# Patient Record
Sex: FEMALE | Race: OTHER | NOT HISPANIC OR LATINO
[De-identification: names, ages, dates, MRNs, and addresses within clinical notes are randomized per-mention and may not be internally consistent; named-entity substitution may affect disease eponyms.]

---

## 2018-09-04 ENCOUNTER — APPOINTMENT (OUTPATIENT)
Dept: INTERNAL MEDICINE | Facility: CLINIC | Age: 23
End: 2018-09-04
Payer: COMMERCIAL

## 2018-09-04 VITALS
DIASTOLIC BLOOD PRESSURE: 82 MMHG | HEIGHT: 66 IN | TEMPERATURE: 98.2 F | BODY MASS INDEX: 41.95 KG/M2 | RESPIRATION RATE: 12 BRPM | OXYGEN SATURATION: 99 % | SYSTOLIC BLOOD PRESSURE: 125 MMHG | HEART RATE: 67 BPM | WEIGHT: 261 LBS

## 2018-09-04 DIAGNOSIS — Z83.3 FAMILY HISTORY OF DIABETES MELLITUS: ICD-10-CM

## 2018-09-04 PROCEDURE — 99385 PREV VISIT NEW AGE 18-39: CPT

## 2018-09-06 LAB
25(OH)D3 SERPL-MCNC: 23.4 NG/ML
ALBUMIN SERPL ELPH-MCNC: 4.5 G/DL
ALP BLD-CCNC: 63 U/L
ALT SERPL-CCNC: 19 U/L
ANION GAP SERPL CALC-SCNC: 12 MMOL/L
AST SERPL-CCNC: 21 U/L
BASOPHILS # BLD AUTO: 0.02 K/UL
BASOPHILS NFR BLD AUTO: 0.3 %
BILIRUB SERPL-MCNC: 0.3 MG/DL
BUN SERPL-MCNC: 13 MG/DL
CALCIUM SERPL-MCNC: 10.2 MG/DL
CHLORIDE SERPL-SCNC: 103 MMOL/L
CO2 SERPL-SCNC: 27 MMOL/L
CREAT SERPL-MCNC: 0.78 MG/DL
EOSINOPHIL # BLD AUTO: 0.08 K/UL
EOSINOPHIL NFR BLD AUTO: 1.1 %
GLUCOSE SERPL-MCNC: 81 MG/DL
HBA1C MFR BLD HPLC: 5.4 %
HCT VFR BLD CALC: 44.1 %
HGB BLD-MCNC: 13.9 G/DL
IMM GRANULOCYTES NFR BLD AUTO: 0.1 %
LYMPHOCYTES # BLD AUTO: 1.65 K/UL
LYMPHOCYTES NFR BLD AUTO: 23.4 %
MAN DIFF?: NORMAL
MCHC RBC-ENTMCNC: 26.9 PG
MCHC RBC-ENTMCNC: 31.5 GM/DL
MCV RBC AUTO: 85.3 FL
MONOCYTES # BLD AUTO: 0.4 K/UL
MONOCYTES NFR BLD AUTO: 5.7 %
NEUTROPHILS # BLD AUTO: 4.88 K/UL
NEUTROPHILS NFR BLD AUTO: 69.4 %
PLATELET # BLD AUTO: 275 K/UL
POTASSIUM SERPL-SCNC: 4.5 MMOL/L
PROT SERPL-MCNC: 7.8 G/DL
RBC # BLD: 5.17 M/UL
RBC # FLD: 15.6 %
SODIUM SERPL-SCNC: 142 MMOL/L
TSH SERPL-ACNC: 1.74 UIU/ML
VIT B12 SERPL-MCNC: 669 PG/ML
WBC # FLD AUTO: 7.04 K/UL

## 2018-09-06 NOTE — HEALTH RISK ASSESSMENT
[With Family] : lives with family [Employed] : employed [Single] : single [Sexually Active] : sexually active [] : No [de-identified] : occasional [PapSmearDate] : about 6 years ago [FreeTextEntry2] :

## 2018-09-06 NOTE — ASSESSMENT
[FreeTextEntry1] : Physical\par blood work ordered\par referred to gyn\par \par Obesity\par referred to nutritionist\par \par irregular periods\par referred to gyn\par \par f/u in one week for lab resukts

## 2018-09-06 NOTE — HISTORY OF PRESENT ILLNESS
[FreeTextEntry1] : 21 yo female, accompanied by her mother, presents for annual physical.\par She reports of having irregular periods. Had 2 periods in the last 4 months. \par

## 2018-09-21 ENCOUNTER — APPOINTMENT (OUTPATIENT)
Dept: INTERNAL MEDICINE | Facility: CLINIC | Age: 23
End: 2018-09-21

## 2018-09-25 LAB
CHOLEST SERPL-MCNC: 135 MG/DL
CHOLEST/HDLC SERPL: 3.1 RATIO
HDLC SERPL-MCNC: 44 MG/DL
LDLC SERPL CALC-MCNC: 57 MG/DL
TRIGL SERPL-MCNC: 171 MG/DL

## 2018-10-20 ENCOUNTER — APPOINTMENT (OUTPATIENT)
Dept: ORTHOPEDIC SURGERY | Facility: CLINIC | Age: 23
End: 2018-10-20
Payer: COMMERCIAL

## 2018-10-20 VITALS
HEART RATE: 69 BPM | SYSTOLIC BLOOD PRESSURE: 122 MMHG | BODY MASS INDEX: 41.78 KG/M2 | HEIGHT: 66 IN | DIASTOLIC BLOOD PRESSURE: 82 MMHG | WEIGHT: 260 LBS

## 2018-10-20 DIAGNOSIS — M54.6 PAIN IN THORACIC SPINE: ICD-10-CM

## 2018-10-20 DIAGNOSIS — Z78.9 OTHER SPECIFIED HEALTH STATUS: ICD-10-CM

## 2018-10-20 DIAGNOSIS — Z87.39 PERSONAL HISTORY OF OTHER DISEASES OF THE MUSCULOSKELETAL SYSTEM AND CONNECTIVE TISSUE: ICD-10-CM

## 2018-10-20 DIAGNOSIS — E66.9 OBESITY, UNSPECIFIED: ICD-10-CM

## 2018-10-20 DIAGNOSIS — S39.012A STRAIN OF MUSCLE, FASCIA AND TENDON OF LOWER BACK, INITIAL ENCOUNTER: ICD-10-CM

## 2018-10-20 DIAGNOSIS — M54.2 CERVICALGIA: ICD-10-CM

## 2018-10-20 PROCEDURE — 72070 X-RAY EXAM THORAC SPINE 2VWS: CPT

## 2018-10-20 PROCEDURE — 72040 X-RAY EXAM NECK SPINE 2-3 VW: CPT

## 2018-10-20 PROCEDURE — 99203 OFFICE O/P NEW LOW 30 MIN: CPT

## 2018-10-21 PROBLEM — Z87.39 HISTORY OF BACK PAIN: Status: RESOLVED | Noted: 2018-10-20 | Resolved: 2018-10-21

## 2018-10-23 PROBLEM — M54.6 MIDLINE THORACIC BACK PAIN, UNSPECIFIED CHRONICITY: Status: ACTIVE | Noted: 2018-10-20

## 2018-10-23 PROBLEM — S39.012A BACK STRAIN: Status: ACTIVE | Noted: 2018-09-19

## 2018-10-23 PROBLEM — M54.2 CERVICAL PAIN (NECK): Status: ACTIVE | Noted: 2018-10-20

## 2018-10-31 ENCOUNTER — APPOINTMENT (OUTPATIENT)
Dept: ORTHOPEDIC SURGERY | Facility: CLINIC | Age: 23
End: 2018-10-31

## 2018-11-07 ENCOUNTER — APPOINTMENT (OUTPATIENT)
Dept: ORTHOPEDIC SURGERY | Facility: CLINIC | Age: 23
End: 2018-11-07
Payer: COMMERCIAL

## 2018-11-07 VITALS
HEIGHT: 66 IN | WEIGHT: 260 LBS | BODY MASS INDEX: 41.78 KG/M2 | SYSTOLIC BLOOD PRESSURE: 127 MMHG | HEART RATE: 65 BPM | DIASTOLIC BLOOD PRESSURE: 77 MMHG

## 2018-11-07 DIAGNOSIS — M41.9 SCOLIOSIS, UNSPECIFIED: ICD-10-CM

## 2018-11-07 PROCEDURE — 99214 OFFICE O/P EST MOD 30 MIN: CPT

## 2019-10-05 ENCOUNTER — EMERGENCY (EMERGENCY)
Facility: HOSPITAL | Age: 24
LOS: 1 days | Discharge: HOME | End: 2019-10-05
Attending: STUDENT IN AN ORGANIZED HEALTH CARE EDUCATION/TRAINING PROGRAM | Admitting: STUDENT IN AN ORGANIZED HEALTH CARE EDUCATION/TRAINING PROGRAM
Payer: COMMERCIAL

## 2019-10-05 ENCOUNTER — EMERGENCY (EMERGENCY)
Facility: HOSPITAL | Age: 24
LOS: 1 days | Discharge: HOME | End: 2019-10-05
Attending: EMERGENCY MEDICINE
Payer: COMMERCIAL

## 2019-10-05 VITALS
WEIGHT: 242.07 LBS | HEART RATE: 66 BPM | RESPIRATION RATE: 17 BRPM | OXYGEN SATURATION: 96 % | TEMPERATURE: 98 F | SYSTOLIC BLOOD PRESSURE: 110 MMHG | HEIGHT: 65 IN | DIASTOLIC BLOOD PRESSURE: 80 MMHG

## 2019-10-05 VITALS
RESPIRATION RATE: 18 BRPM | SYSTOLIC BLOOD PRESSURE: 122 MMHG | OXYGEN SATURATION: 100 % | DIASTOLIC BLOOD PRESSURE: 70 MMHG | TEMPERATURE: 98 F | HEART RATE: 84 BPM

## 2019-10-05 PROCEDURE — 99283 EMERGENCY DEPT VISIT LOW MDM: CPT

## 2019-10-05 PROCEDURE — 99284 EMERGENCY DEPT VISIT MOD MDM: CPT

## 2019-10-05 RX ORDER — ACETAMINOPHEN 500 MG
650 TABLET ORAL ONCE
Refills: 0 | Status: COMPLETED | OUTPATIENT
Start: 2019-10-05 | End: 2019-10-05

## 2019-10-05 RX ORDER — LIDOCAINE 4 G/100G
10 CREAM TOPICAL ONCE
Refills: 0 | Status: COMPLETED | OUTPATIENT
Start: 2019-10-05 | End: 2019-10-05

## 2019-10-05 RX ORDER — FAMOTIDINE 10 MG/ML
40 INJECTION INTRAVENOUS ONCE
Refills: 0 | Status: COMPLETED | OUTPATIENT
Start: 2019-10-05 | End: 2019-10-05

## 2019-10-05 RX ADMIN — LIDOCAINE 10 MILLILITER(S): 4 CREAM TOPICAL at 13:17

## 2019-10-05 RX ADMIN — Medication 10 MILLILITER(S): at 13:17

## 2019-10-05 RX ADMIN — Medication 650 MILLIGRAM(S): at 13:16

## 2019-10-05 RX ADMIN — FAMOTIDINE 40 MILLIGRAM(S): 10 INJECTION INTRAVENOUS at 13:17

## 2019-10-05 NOTE — ED ADULT TRIAGE NOTE - CHIEF COMPLAINT QUOTE
Pt seen in this ED today.  C/o reoccurring ABD pain.  Given Pepcid / Maalox with partial relief.  Pain reoccurred with food this PM.  4th ER visit this week for same complaint.  Appears uncomfortable, tearful in tirage.   ***EKG completed and abnormal with T wave inversions.

## 2019-10-05 NOTE — ED PROVIDER NOTE - CHPI ED SYMPTOMS POS
DECREASED EATING/DRINKING/flu-like symptoms, palpitations, abdominal pain DECREASED EATING/DRINKING/epigastric pain

## 2019-10-05 NOTE — ED PROVIDER NOTE - CLINICAL SUMMARY MEDICAL DECISION MAKING FREE TEXT BOX
24yoF with no PMH or PSH presenting with sharp epigastric pain exacerbated by eating and drinking for 1 day in the setting of recent NSAID use, likely esophagitis/gastritis. Will give pt Pepcid, Maalox, lidocaine, and Tylenol and re-assess for response. Pt counseled on discontinuing NSAIDs. 24yoF with no PMH or PSH presenting with sharp epigastric pain exacerbated by eating and drinking for 1 day in the setting of recent NSAID use on empty stomach, likely mild esophagitis/gastritis. Will give patient Pepcid, Maalox, lidocaine, and Tylenol and re-assess for response. Patient counseled on discontinuing NSAIDs and if restarting consider eating with food. Abdomen is soft, non-tender, unlikely acute surgical pathology, strict return precautions given.

## 2019-10-05 NOTE — ED PROVIDER NOTE - CHPI ED SYMPTOMS NEG
no nausea/no chills/no vomiting no burning urination/no chills/no nausea/no fever/no vomiting/no blood in stool

## 2019-10-05 NOTE — ED PROVIDER NOTE - NS_EDPROVIDERDISPOUSERTYPE_ED_A_ED
Scribe Attestation (For Scribes USE Only)... Medical Students Attestation (For Medical Student USE Only).../Scribe Attestation (For Scribes USE Only)... Medical Students Attestation (For Medical Student USE Only).../Attending Attestation (For Attendings USE Only).../Scribe Attestation (For Scribes USE Only)...

## 2019-10-05 NOTE — ED PROVIDER NOTE - OBJECTIVE STATEMENT
24 year old female with no significant pmhx or pshx presents to the ED c/o epigastric abdominal pain which has worsened since eating last night. Presented to [facility] for flu-like symptoms x1 week ago and was diagnosed with a right ear infection. However, pt developed chest palpitations yesterday and returned to the hospital but was discharged with no treatment. After discharge, pt developed a sharp, stabbing abdominal pain with pain lasting anywhere from a few seconds to a couple of minutes. Pt has been taking x2-3 Advil every night for the past x2 weeks but switched over x2 days ago to Tylenol. Last took Tylenol last night. Notes decreased appetite. Denies n/v, fever, chills, hematochezia, urinary symptoms. Denies pmhx abdominal surgery, IBS. LNMP: x1 month ago. Pt currently sexually active, does not use condoms, last time x1 month ago. Previous smoker, starting at 17, quit x1 year afterward. 24 year old female with no significant pmhx or pshx presents to the ED c/o epigastric abdominal pain for 1 day in the setting of recent NSAID use. 1 wk ago, pt was having flu-like symptoms (weakness, myalgias, decreased PO intake) and R ear pain; she was diagnosed with a R ear infection at [facility], for which she has been taking clindamycin 300mg TID, ciprodex ear drops, and Advil (3 at night, 2 during the day). 1 day prior to presentation, she was showering and had an episode of chest pain, palpitations, and nausea and went back to the [facility] ED, where she had an EKG and was discharged. When she got home last night, she started having a dull ache in her epigastric area that is exacerbated by eating. Eating and drinking causes a sharp, stabbing, 10/10 pain as the food/liquid travels down her esophagus and goes away after a few seconds to a minute. Reports intermittent episodes of diarrhea this week as she has changed her diet to softer foods, no blood in the stool, no melena. Denies nausea/vomiting, fever/chills, urinary symptoms, hx of abdominal surgery, GERD or other abdominal issues. Has been intentionally trying to lose weight; lost 25lbs in 3 month. LMP was 9/20/19, is sexually active (last time over a month ago), does not use contraception. Previous smoker (less 1ppd for 1 year), quit 7 yrs ago at age 17. 24yoF with no significant pmhx or pshx presents to the ED c/o epigastric abdominal pain for 1 day in the setting of recent NSAID use. 1 wk ago, pt was having flu-like symptoms (weakness, myalgias, decreased PO intake) and R ear pain; she was diagnosed with a R ear infection at [facility], for which she has been taking clindamycin 300mg TID, ciprodex ear drops, and Advil (3 at night, 2 during the day). 1 day prior to presentation, she was showering and had an episode of chest pain, palpitations, and nausea and went back to the [facility] ED, where she had an EKG and was discharged. When she got home last night, she started having a dull ache in her epigastric area that is exacerbated by eating and drinking; it causes a sharp, stabbing, 10/10 pain as the food/liquid travels down her esophagus and goes away after a few seconds to a minute. Reports intermittent episodes of diarrhea this week as she has changed her diet to softer foods, no blood in the stool, no melena. Denies nausea/vomiting, fever/chills, urinary symptoms, hx of abdominal surgery, GERD or other abdominal issues/conditions. Has been intentionally trying to lose weight; lost 25lbs in 3 month. LMP was 9/20/19, is sexually active (last time over a month ago), does not use contraception. Previous smoker (less 1ppd for 1 year), quit 7 yrs ago at age 17. Reports she stopped taking Advil 2 days ago, has switched to Tylenol (last took it last night). 24yoF with no significant PMHx or PSHx presents to the ED c/o epigastric abdominal pain for 1 day in the setting of recent NSAID use. 1 wk ago, pt was having flu-like symptoms (weakness, myalgias, decreased PO intake) and R ear pain; she was diagnosed with a R ear infection at [facility], for which she has been taking clindamycin 300mg TID, ciprodex ear drops, and Advil (3 at night, 2 during the day). 1 day prior to presentation, she was showering and had an episode of chest pain, palpitations, and nausea and went back to the [facility] ED, where she had an EKG reportedly normal and was discharged. When she got home last night, she started having a dull ache in her epigastric area that is exacerbated by eating and drinking; it causes a sharp, stabbing, 10/10 pain as the food/liquid travels down her esophagus and goes away after a few seconds to a minute. Reports intermittent episodes of diarrhea this week as she has changed her diet to softer foods, no blood in the stool, no melena. Denies nausea/vomiting, fever/chills, urinary symptoms, hx of abdominal surgery, GERD or other abdominal issues/conditions. LMP was 9/20/19, is sexually active (last time over a month ago), does not use contraception. Previous smoker (less 1ppd for 1 year), quit 7 yrs ago at age 17. Reports she stopped taking Advil 2 days ago, has switched to Tylenol (last took it last night).

## 2019-10-05 NOTE — ED PROVIDER NOTE - PHYSICAL EXAMINATION
PHYSICAL EXAM:  GENERAL: NAD, Well-developed, Conversing appropriately  HEENT: Conjunctiva and sclera clear, Normal oropharynx without erythema, Neck supple without LAD, No JVD  LUNGS: Clear to auscultation bilaterally; No wheezing  HEART: Regular rate and rhythm; Normal S1 and S2; No murmurs  ABDOMEN: Obese, Soft, Nontender, Nondistended; Bowel sounds present; No guarding, rigidity; Negative Daniel's sign  EXTREMITIES:  2+ Peripheral Pulses, No clubbing, cyanosis, or edema  SKIN: No rashes or lesions  NERVOUS SYSTEM:  Alert & Oriented X3, Good concentration; Motor Strength 5/5 B/L upper and lower extremities; DTRs 2+ intact and symmetric PHYSICAL EXAM:  GENERAL: NAD, Well-developed, Conversing appropriately  HEENT: Conjunctiva and sclera clear, Normal oropharynx without erythema, Neck supple without LAD, +R ear with wax and debris  LUNGS: Clear to auscultation bilaterally; No wheezing  HEART: Regular rate and rhythm; Normal S1 and S2; No murmur appreciated  ABDOMEN: Obese, Nontender, Nondistended; Bowel sounds present; No guarding, rigidity, or rebound; Negative Daniel's sign, no tenderness at McBurney's point  EXTREMITIES:  2+ Peripheral Pulses, No LE edema  SKIN: No rashes appreciated  NERVOUS SYSTEM: Alert & Oriented X3, No gross focal deficits, Normal, narrow-based gait PHYSICAL EXAMINATION:  VITALS REVIEWED.  VS normal, obese per BMI  GENERALIZED APPEARANCE:  Comfortable, no acute distress, ambulating without difficulty, normal gait  SKIN:  Warm, dry, no cyanosis  HEAD:  No obvious scalp lesions  EYES:  Conjunctiva pink, no icterus  ENMT:  Mucus membranes moist, no stridor, R ear wax and debris, (+) slight bulging TM, no mastoid tenderness, L ear WNL  NECK:  Supple, non-tender  CHEST AND RESPIRATORY:  Clear to auscultation B/L, good air entry B/L, equal chest expansion  HEART AND CARDIOVASCULAR:  Regular rate, no obvious murmur  ABDOMEN AND GI:  Soft, non-tender, non-distended.  No rebound, no guarding, negative Daniel's sign, no tenderness at McBurney's point, no CVA-area tenderness  EXTREMITIES:  No deformity, edema, or calf tenderness  NEURO: AAOx3, gross motor and sensory intact

## 2019-10-05 NOTE — ED PROVIDER NOTE - PATIENT PORTAL LINK FT
You can access the FollowMyHealth Patient Portal offered by [facility] by registering at the following website: [email or web address]. By joining [employer]’s FollowMyHealth portal, you will also be able to view your health information using other applications (apps) compatible with our system.

## 2019-10-06 VITALS
HEART RATE: 86 BPM | OXYGEN SATURATION: 98 % | RESPIRATION RATE: 17 BRPM | DIASTOLIC BLOOD PRESSURE: 76 MMHG | SYSTOLIC BLOOD PRESSURE: 149 MMHG

## 2019-10-06 LAB
ALBUMIN SERPL ELPH-MCNC: 4.3 G/DL — SIGNIFICANT CHANGE UP (ref 3.3–5)
ALP SERPL-CCNC: 68 U/L — SIGNIFICANT CHANGE UP (ref 40–120)
ALT FLD-CCNC: 14 U/L — SIGNIFICANT CHANGE UP (ref 4–33)
ANION GAP SERPL CALC-SCNC: 13 MMO/L — SIGNIFICANT CHANGE UP (ref 7–14)
AST SERPL-CCNC: 16 U/L — SIGNIFICANT CHANGE UP (ref 4–32)
BASOPHILS # BLD AUTO: 0.05 K/UL — SIGNIFICANT CHANGE UP (ref 0–0.2)
BASOPHILS NFR BLD AUTO: 0.6 % — SIGNIFICANT CHANGE UP (ref 0–2)
BILIRUB SERPL-MCNC: 0.6 MG/DL — SIGNIFICANT CHANGE UP (ref 0.2–1.2)
BUN SERPL-MCNC: 6 MG/DL — LOW (ref 7–23)
CALCIUM SERPL-MCNC: 10 MG/DL — SIGNIFICANT CHANGE UP (ref 8.4–10.5)
CHLORIDE SERPL-SCNC: 100 MMOL/L — SIGNIFICANT CHANGE UP (ref 98–107)
CO2 SERPL-SCNC: 25 MMOL/L — SIGNIFICANT CHANGE UP (ref 22–31)
CREAT SERPL-MCNC: 0.88 MG/DL — SIGNIFICANT CHANGE UP (ref 0.5–1.3)
EOSINOPHIL # BLD AUTO: 0.03 K/UL — SIGNIFICANT CHANGE UP (ref 0–0.5)
EOSINOPHIL NFR BLD AUTO: 0.4 % — SIGNIFICANT CHANGE UP (ref 0–6)
GLUCOSE SERPL-MCNC: 112 MG/DL — HIGH (ref 70–99)
HCG SERPL-ACNC: < 5 MIU/ML — SIGNIFICANT CHANGE UP
HCT VFR BLD CALC: 40 % — SIGNIFICANT CHANGE UP (ref 34.5–45)
HGB BLD-MCNC: 12.9 G/DL — SIGNIFICANT CHANGE UP (ref 11.5–15.5)
IMM GRANULOCYTES NFR BLD AUTO: 0.3 % — SIGNIFICANT CHANGE UP (ref 0–1.5)
LIDOCAIN IGE QN: 17.7 U/L — SIGNIFICANT CHANGE UP (ref 7–60)
LYMPHOCYTES # BLD AUTO: 1.02 K/UL — SIGNIFICANT CHANGE UP (ref 1–3.3)
LYMPHOCYTES # BLD AUTO: 13.2 % — SIGNIFICANT CHANGE UP (ref 13–44)
MCHC RBC-ENTMCNC: 25.6 PG — LOW (ref 27–34)
MCHC RBC-ENTMCNC: 32.3 % — SIGNIFICANT CHANGE UP (ref 32–36)
MCV RBC AUTO: 79.4 FL — LOW (ref 80–100)
MONOCYTES # BLD AUTO: 0.63 K/UL — SIGNIFICANT CHANGE UP (ref 0–0.9)
MONOCYTES NFR BLD AUTO: 8.1 % — SIGNIFICANT CHANGE UP (ref 2–14)
NEUTROPHILS # BLD AUTO: 5.99 K/UL — SIGNIFICANT CHANGE UP (ref 1.8–7.4)
NEUTROPHILS NFR BLD AUTO: 77.4 % — HIGH (ref 43–77)
NRBC # FLD: 0 K/UL — SIGNIFICANT CHANGE UP (ref 0–0)
PLATELET # BLD AUTO: 299 K/UL — SIGNIFICANT CHANGE UP (ref 150–400)
PMV BLD: 9.8 FL — SIGNIFICANT CHANGE UP (ref 7–13)
POTASSIUM SERPL-MCNC: 3.5 MMOL/L — SIGNIFICANT CHANGE UP (ref 3.5–5.3)
POTASSIUM SERPL-SCNC: 3.5 MMOL/L — SIGNIFICANT CHANGE UP (ref 3.5–5.3)
PROT SERPL-MCNC: 7.9 G/DL — SIGNIFICANT CHANGE UP (ref 6–8.3)
RBC # BLD: 5.04 M/UL — SIGNIFICANT CHANGE UP (ref 3.8–5.2)
RBC # FLD: 14.4 % — SIGNIFICANT CHANGE UP (ref 10.3–14.5)
SODIUM SERPL-SCNC: 138 MMOL/L — SIGNIFICANT CHANGE UP (ref 135–145)
WBC # BLD: 7.74 K/UL — SIGNIFICANT CHANGE UP (ref 3.8–10.5)
WBC # FLD AUTO: 7.74 K/UL — SIGNIFICANT CHANGE UP (ref 3.8–10.5)

## 2019-10-06 RX ORDER — FAMOTIDINE 10 MG/ML
1 INJECTION INTRAVENOUS
Qty: 40 | Refills: 0
Start: 2019-10-06 | End: 2019-10-25

## 2019-10-06 RX ORDER — FAMOTIDINE 10 MG/ML
20 INJECTION INTRAVENOUS ONCE
Refills: 0 | Status: COMPLETED | OUTPATIENT
Start: 2019-10-06 | End: 2019-10-06

## 2019-10-06 RX ORDER — LIDOCAINE 4 G/100G
10 CREAM TOPICAL ONCE
Refills: 0 | Status: COMPLETED | OUTPATIENT
Start: 2019-10-06 | End: 2019-10-06

## 2019-10-06 RX ORDER — ONDANSETRON 8 MG/1
4 TABLET, FILM COATED ORAL ONCE
Refills: 0 | Status: COMPLETED | OUTPATIENT
Start: 2019-10-06 | End: 2019-10-06

## 2019-10-06 RX ORDER — SODIUM CHLORIDE 9 MG/ML
1000 INJECTION INTRAMUSCULAR; INTRAVENOUS; SUBCUTANEOUS ONCE
Refills: 0 | Status: COMPLETED | OUTPATIENT
Start: 2019-10-06 | End: 2019-10-06

## 2019-10-06 RX ADMIN — FAMOTIDINE 20 MILLIGRAM(S): 10 INJECTION INTRAVENOUS at 00:37

## 2019-10-06 RX ADMIN — ONDANSETRON 4 MILLIGRAM(S): 8 TABLET, FILM COATED ORAL at 00:37

## 2019-10-06 RX ADMIN — LIDOCAINE 10 MILLILITER(S): 4 CREAM TOPICAL at 02:30

## 2019-10-06 RX ADMIN — SODIUM CHLORIDE 1000 MILLILITER(S): 9 INJECTION INTRAMUSCULAR; INTRAVENOUS; SUBCUTANEOUS at 00:37

## 2019-10-06 RX ADMIN — SODIUM CHLORIDE 1000 MILLILITER(S): 9 INJECTION INTRAMUSCULAR; INTRAVENOUS; SUBCUTANEOUS at 01:33

## 2019-10-06 RX ADMIN — Medication 30 MILLILITER(S): at 02:30

## 2019-10-06 RX ADMIN — Medication 10 MILLILITER(S): at 01:33

## 2019-10-06 NOTE — ED PROVIDER NOTE - PATIENT PORTAL LINK FT
You can access the FollowMyHealth Patient Portal offered by [facility] by registering at the following website: [email or web address]. By joining [employer]’s FollowMyHealth portal, you will also be able to view your health information using other applications (apps) compatible with our system.

## 2019-10-06 NOTE — ED PROVIDER NOTE - NSFOLLOWUPINSTRUCTIONS_ED_ALL_ED_FT
Gastritis    Gastritis is soreness and swelling (inflammation) of the lining of the stomach. Gastritis can develop as a sudden onset (acute) or long-term (chronic) condition. If gastritis is not treated, it can lead to stomach bleeding and ulcers. Causes include viral and bacterial infections, excessive alcohol consumption, tobacco use, or certain medications. Symptoms include nausea, vomiting, or abdominal pain or burning especially after eating. Avoid foods or drinks that make your symptoms worse such as caffeine, chocolate, spicy foods, acidic foods, or alcohol.    SEEK IMMEDIATE MEDICAL CARE IF YOU HAVE ANY OF THE FOLLOWING SYMPTOMS: black or bloody stools, blood or coffee-ground-colored vomitus, worsening abdominal pain, fever, or inability to keep fluids down.    1. TAKE ALL MEDICATIONS AS DIRECTED.    2. FOR PAIN OR FEVER YOU CAN TAKE ACETAMINOPHEN (TYLENOL) AS NEEDED, AS DIRECTED ON PACKAGING.  3. FOLLOW UP WITH YOUR PRIMARY DOCTOR WITHIN 5 DAYS AS DIRECTED.  4. IF YOU HAD LABS OR IMAGING DONE, YOU WERE GIVEN COPIES OF ALL LABS AND/OR IMAGING RESULTS FROM YOUR ER VISIT--PLEASE TAKE THEM WITH YOU TO YOUR FOLLOW UP APPOINTMENTS.  5. IF NEEDED, CALL PATIENT ACCESS SERVICES AT [phone] TO FIND A PRIMARY CARE PHYSICIAN.  OR CALL [phone] TO MAKE AN APPOINTMENT WITH THE CLINIC.  6. RETURN TO THE ER FOR ANY WORSENING SYMPTOMS OR CONCERNS.

## 2019-10-06 NOTE — ED PROVIDER NOTE - NS ED ROS FT
ROS:  GENERAL: No fever, no chills  EYES: no change in vision  HEENT: no trouble swallowing, no trouble speaking  CARDIAC: no chest pain  PULMONARY: no cough, no shortness of breath  GI: + abdominal pain, + nausea, no vomiting, no diarrhea, no constipation  : No dysuria, no frequency, no change in appearance, or odor of urine  SKIN: no rashes  NEURO: no headache, no weakness  MSK: No joint pain  ~Isael Laughlin D.O. -Resident

## 2019-10-06 NOTE — ED PROVIDER NOTE - NSFOLLOWUPCLINICS_GEN_ALL_ED_FT
[facility]  Gastroenterology  [address] [place] [id number]  Phone: [phone]  Fax:   Follow Up Time: 4-6 Days

## 2019-10-06 NOTE — ED PROVIDER NOTE - PHYSICAL EXAMINATION
Physical Exam:  Gen: NAD, AOx3, non-toxic appearing, able to ambulate without assistance  Head: NCAT  HEENT: EOMI, PEERLA, normal conjunctiva, tongue midline, oral mucosa moist  Lung: CTAB, no respiratory distress, no wheezes/rhonchi/rales B/L, speaking in full sentences  CV: RRR, no murmurs, rubs or gallops  Abd: soft, mild epigastric ttp, ND, no guarding, no rigidity, no rebound tenderness, no CVA tenderness   MSK: no visible deformities, ROM normal in UE/LE, no back pain  Neuro: No focal sensory or motor deficits  Skin: Warm, well perfused, no rash, no leg swelling  Psych: normal affect, calm  ~Isael Laughlin D.O. -Resident

## 2019-10-06 NOTE — ED PROVIDER NOTE - PROGRESS NOTE DETAILS
Patient feels well, tolerating PO. Discussed lab and radiology findings with patient. Patient feels comfortable going home. Gave home care and follow up instructions. Discussed which symptoms to look out for and when to return to the ED for further evaluation. Patient given opportunity to ask questions about their medical condition and had all questions answered. gi fu given and meds to pharmacy

## 2019-10-06 NOTE — ED PROVIDER NOTE - ATTENDING CONTRIBUTION TO CARE
24F no PMH p/w epigastric abd pain. Pt seen in the ED of OSH yesterday and here earlier today for same symptoms. Dc with maalox which provided some symptom relief but then recurred when she took her abx for ear infection. Pt reports taking abx and advil on empty stomach for the past week. Reports epigastric burning with GERD-like symptoms. No chest pain, n/v/d. Denies dark stools.   Exam:    Gen - nad    CV - rrr, no murmurs    Pulm - clear lungs    Abd - epigastric tenderness w/o RUQ tenderness  MDM: 24F p/w 2 days of epigastric abd pain likely 2/2 gastritis in setting of taking Advil and antibiotics on empty stomach; will treat symptomatically and provide GI follow-up. pt counselled to take meds with food, symptoms will take time to resolve and meds can help make more bearable

## 2019-10-06 NOTE — ED POST DISCHARGE NOTE - REASON FOR FOLLOW-UP
Other Patient's mother called the pharmacy never received prescriptions. I called [facility] to confirm they did not receive RX's and re _erxd to Patient's preferred pharmacy.

## 2019-10-06 NOTE — ED PROVIDER NOTE - OBJECTIVE STATEMENT
23yo F no pmhx pw epigastric pain 2x visit today. gastritis sx over last week since taking advil for ear infection. pain with po intake today so came back for further eval. Denies recent trauma, fevers, chills, headache, dizziness, vomiting, dysuria, freq, hematuria, diarrhea, constipation, chest pain, shortness of breath, cough. mild nausea

## 2019-10-06 NOTE — ED PROVIDER NOTE - CLINICAL SUMMARY MEDICAL DECISION MAKING FREE TEXT BOX
23yo F no pmhx pw epigastric pain 2x visit today. gastritis sx over last week since taking advil for ear infection. pain with po intake today so came back for further eval. will do labs r/o pncx, fluids, zofran, pepcid donnatol, reassess

## 2019-10-06 NOTE — ED ADULT NURSE NOTE - OBJECTIVE STATEMENT
pt a&Ox3, c/o intermittent sharp upper mid abdominal /chest pain since last night. pt c/o nausea, denies vomiting, able to have BMs/pass gas, denies blood in stool/urine. LMP september 20, pt breathing veen & unlabored, abd soft, tender, non distended, VSS, mom at bedisde, awiaiting futher orders.

## 2019-10-08 PROBLEM — Z78.9 OTHER SPECIFIED HEALTH STATUS: Chronic | Status: ACTIVE | Noted: 2019-10-05

## 2019-10-10 ENCOUNTER — APPOINTMENT (OUTPATIENT)
Dept: GASTROENTEROLOGY | Facility: CLINIC | Age: 24
End: 2019-10-10

## 2019-10-17 ENCOUNTER — APPOINTMENT (OUTPATIENT)
Dept: GASTROENTEROLOGY | Facility: CLINIC | Age: 24
End: 2019-10-17
Payer: COMMERCIAL

## 2019-10-17 VITALS
WEIGHT: 242 LBS | SYSTOLIC BLOOD PRESSURE: 110 MMHG | OXYGEN SATURATION: 98 % | DIASTOLIC BLOOD PRESSURE: 80 MMHG | HEART RATE: 92 BPM | TEMPERATURE: 98.6 F | HEIGHT: 65 IN | BODY MASS INDEX: 40.32 KG/M2

## 2019-10-17 DIAGNOSIS — R10.13 EPIGASTRIC PAIN: ICD-10-CM

## 2019-10-17 PROCEDURE — 99203 OFFICE O/P NEW LOW 30 MIN: CPT

## 2019-10-17 NOTE — PHYSICAL EXAM
[General Appearance - Alert] : alert [General Appearance - In No Acute Distress] : in no acute distress [Neck Appearance] : the appearance of the neck was normal [Neck Cervical Mass (___cm)] : no neck mass was observed [Jugular Venous Distention Increased] : there was no jugular-venous distention [Thyroid Diffuse Enlargement] : the thyroid was not enlarged [Thyroid Nodule] : there were no palpable thyroid nodules [Heart Rate And Rhythm] : heart rate was normal and rhythm regular [Auscultation Breath Sounds / Voice Sounds] : lungs were clear to auscultation bilaterally [Heart Sounds Gallop] : no gallops [Heart Sounds] : normal S1 and S2 [Murmurs] : no murmurs [Heart Sounds Pericardial Friction Rub] : no pericardial rub [Edema] : there was no peripheral edema [Bowel Sounds] : normal bowel sounds [Abdomen Soft] : soft [] : no hepato-splenomegaly [Abdomen Mass (___ Cm)] : no abdominal mass palpated [Abdomen Tenderness] : non-tender [No CVA Tenderness] : no ~M costovertebral angle tenderness [No Spinal Tenderness] : no spinal tenderness [Impaired Insight] : insight and judgment were intact [Oriented To Time, Place, And Person] : oriented to person, place, and time [Affect] : the affect was normal

## 2019-10-18 NOTE — ASSESSMENT
[FreeTextEntry1] : Patient who developed epigastric pain while taking large amounts of Advil and while on clindamycin for an ear infection.  She is now off of both of these medications and is feeling well without any abdominal pain for the past week.\par \par I explained to the patient that her symptoms were most likely related to these medications.  We will observe the patient off of any medications.  She was advised to contact me if her symptoms recur, in which case we will evaluate for various causes.

## 2019-10-18 NOTE — CONSULT LETTER
[FreeTextEntry1] : Dear Dr. Valerie Vargas,\par \par I had the pleasure of seeing your patient GEOFF ORDONEZ in the office today.  My office note is attached.\par \par Thank you very much for allowing me to participate in the care of your patient.\par \par Sincerely,\par \par Juanito Pablo M.D., FACG, FACP\par Director, Celiac Program at [facility]\par  of Medicine\par [employer] [facility] Practice Director,\par [facility] - Gastroenterology/Internal Medicine at [place]\[facility] [address]\[place] [address]\par Tel: [phone]\par Email: [email or web address] \par \par The attached note has been created using a voice recognition system (Dragon).  There may be some misspellings and typos.  Please call my office if you have any issues or questions.

## 2019-10-18 NOTE — HISTORY OF PRESENT ILLNESS
[FreeTextEntry1] : The patient is a 24-year-old woman who 2 weeks ago developed an ear infection for which she was treated with clindamycin and for which she has been taking large amounts of Advil about 5 to 6/day.  She has been to the [facility] ER 4 times in the past 2 weeks.  The first 2 times were related to the ear infection and palpitations.  She then went to the ER 1 week ago after developing a pain in her epigastrium which she describes as a hunger pain that also extended down her chest.  She was told that she had gastritis in the ER and was told to take over-the-counter Pepcid, which she has not taken.  That night, she went back to the emergency room with epigastric pain.  She was given intravenous fluid and pain medications and again sent home.  She was advised to stop taking Advil.  Now, for the past week she has not had any heartburn, dysphasia, or abdominal pain.  She has 1 bowel movement a day without melena or bright red blood per rectum.  She has lost 30 pounds in the past 3 months on weight watchers.  The patient has not been hospitalized in the past year and denies any cardiac issues.

## 2020-04-01 ENCOUNTER — APPOINTMENT (OUTPATIENT)
Dept: INTERNAL MEDICINE | Facility: CLINIC | Age: 25
End: 2020-04-01
Payer: COMMERCIAL

## 2020-04-01 VITALS
HEIGHT: 65 IN | HEART RATE: 85 BPM | RESPIRATION RATE: 12 BRPM | DIASTOLIC BLOOD PRESSURE: 70 MMHG | WEIGHT: 250 LBS | BODY MASS INDEX: 41.65 KG/M2 | TEMPERATURE: 98.3 F | OXYGEN SATURATION: 98 % | SYSTOLIC BLOOD PRESSURE: 110 MMHG

## 2020-04-01 DIAGNOSIS — J03.90 ACUTE TONSILLITIS, UNSPECIFIED: ICD-10-CM

## 2020-04-01 DIAGNOSIS — Z87.09 PERSONAL HISTORY OF OTHER DISEASES OF THE RESPIRATORY SYSTEM: ICD-10-CM

## 2020-04-01 LAB — S PYO AG SPEC QL IA: NEGATIVE

## 2020-04-01 PROCEDURE — 99214 OFFICE O/P EST MOD 30 MIN: CPT

## 2020-04-01 PROCEDURE — 87880 STREP A ASSAY W/OPTIC: CPT | Mod: QW

## 2020-04-01 RX ORDER — AMOXICILLIN AND CLAVULANATE POTASSIUM 875; 125 MG/1; MG/1
875-125 TABLET, COATED ORAL TWICE DAILY
Qty: 14 | Refills: 0 | Status: ACTIVE | COMMUNITY
Start: 2020-04-01 | End: 1900-01-01

## 2020-04-02 PROBLEM — J03.90 ACUTE TONSILLITIS: Status: ACTIVE | Noted: 2020-04-02

## 2020-04-02 NOTE — PLAN
[FreeTextEntry1] : \par 1. Sore Throat/ Acute tonsillitis \par Rapid Strep - negative\par see plan\par salt water gurgles\par supportive care d/w pt\par

## 2020-04-02 NOTE — HISTORY OF PRESENT ILLNESS
[de-identified] : \par 24 year old female presents for an acute visit with a sore throat x 1 week and difficulty swallowing. She denies any congestion, fever/chills, cough, shortness of breath, difficulty, chest pain, headaches/dizziness, N/V/ abdominal pain. Her appetite has been good and she has been staying well hydrated.

## 2020-04-02 NOTE — PHYSICAL EXAM
[Normal TMs] : both tympanic membranes were normal [Supple] : supple [Normal Rate] : normal rate  [Regular Rhythm] : with a regular rhythm [Normal S1, S2] : normal S1 and S2 [No Edema] : there was no peripheral edema [Soft] : abdomen soft [Non Tender] : non-tender [Non-distended] : non-distended [Normal Bowel Sounds] : normal bowel sounds [No CVA Tenderness] : no CVA  tenderness [No Spinal Tenderness] : no spinal tenderness [Normal] : no rash [Coordination Grossly Intact] : coordination grossly intact [No Focal Deficits] : no focal deficits [Normal Gait] : normal gait [Normal Affect] : the affect was normal [Normal Insight/Judgement] : insight and judgment were intact [de-identified] : +erythematous pharynx with exudate

## 2020-05-04 ENCOUNTER — APPOINTMENT (OUTPATIENT)
Dept: INTERNAL MEDICINE | Facility: CLINIC | Age: 25
End: 2020-05-04

## 2020-08-12 ENCOUNTER — APPOINTMENT (OUTPATIENT)
Dept: INTERNAL MEDICINE | Facility: CLINIC | Age: 25
End: 2020-08-12
Payer: COMMERCIAL

## 2020-08-12 ENCOUNTER — LABORATORY RESULT (OUTPATIENT)
Age: 25
End: 2020-08-12

## 2020-08-12 VITALS
BODY MASS INDEX: 44.27 KG/M2 | RESPIRATION RATE: 14 BRPM | WEIGHT: 266 LBS | HEART RATE: 82 BPM | OXYGEN SATURATION: 99 % | TEMPERATURE: 97.7 F | DIASTOLIC BLOOD PRESSURE: 70 MMHG | SYSTOLIC BLOOD PRESSURE: 103 MMHG

## 2020-08-12 DIAGNOSIS — Z00.00 ENCOUNTER FOR GENERAL ADULT MEDICAL EXAMINATION W/OUT ABNORMAL FINDINGS: ICD-10-CM

## 2020-08-12 PROCEDURE — 99395 PREV VISIT EST AGE 18-39: CPT

## 2020-08-12 NOTE — PHYSICAL EXAM
[Normal Sclera/Conjunctiva] : normal sclera/conjunctiva [Normal Outer Ear/Nose] : the outer ears and nose were normal in appearance [Normal Oropharynx] : the oropharynx was normal [No JVD] : no jugular venous distention [Normal] : normal rate, regular rhythm, normal S1 and S2 and no murmur heard [No Edema] : there was no peripheral edema [Soft] : abdomen soft [Non Tender] : non-tender [Non-distended] : non-distended [Normal Anterior Cervical Nodes] : no anterior cervical lymphadenopathy [No CVA Tenderness] : no CVA  tenderness [No Rash] : no rash [No Joint Swelling] : no joint swelling [No Focal Deficits] : no focal deficits [Normal Gait] : normal gait [Coordination Grossly Intact] : coordination grossly intact [Alert and Oriented x3] : oriented to person, place, and time [Normal Affect] : the affect was normal [Normal Insight/Judgement] : insight and judgment were intact [EOMI] : extraocular movements intact [Normal TMs] : both tympanic membranes were normal [PERRL] : pupils equal round and reactive to light [No Lymphadenopathy] : no lymphadenopathy [Supple] : supple [Normal Bowel Sounds] : normal bowel sounds [Thyroid Normal, No Nodules] : the thyroid was normal and there were no nodules present [Normal Posterior Cervical Nodes] : no posterior cervical lymphadenopathy [No Spinal Tenderness] : no spinal tenderness [Grossly Normal Strength/Tone] : grossly normal strength/tone

## 2020-08-13 LAB
ESTIMATED AVERAGE GLUCOSE: 108 MG/DL
HBA1C MFR BLD HPLC: 5.4 %

## 2020-08-13 NOTE — ASSESSMENT
[FreeTextEntry1] : Physical\par She is UTD with her PAP-done 4-5 months ago\par blood work ordered\par \par tonsillitis\par cont Clinda\par follows with ENT\par \par \par follow up in one week for lab results\par

## 2020-08-13 NOTE — HISTORY OF PRESENT ILLNESS
[de-identified] : Ms. GEOFF ORDONEZ is a 24 year old female presents for annual physical\par Pt states she has been having sore throat/tonsillitis on and off since April\par She followed with ENT. Had fever, sore throat again last week. She saw her ENT again. She tested positive for strep last  and was started on Clindamycin for 12 days, (which pt says she is still taking ). Her ENT wants her to get blood work to check for mono\par She is feeling better now\par \par \par \par

## 2020-08-19 LAB
25(OH)D3 SERPL-MCNC: 26.6 NG/ML
ALBUMIN SERPL ELPH-MCNC: 4.5 G/DL
ALP BLD-CCNC: 69 U/L
ALT SERPL-CCNC: 21 U/L
ANION GAP SERPL CALC-SCNC: 15 MMOL/L
APPEARANCE: ABNORMAL
AST SERPL-CCNC: 18 U/L
BASOPHILS # BLD AUTO: 0.04 K/UL
BASOPHILS NFR BLD AUTO: 0.6 %
BILIRUB SERPL-MCNC: 0.2 MG/DL
BILIRUBIN URINE: NEGATIVE
BLOOD URINE: NEGATIVE
BUN SERPL-MCNC: 8 MG/DL
CALCIUM SERPL-MCNC: 9.9 MG/DL
CHLORIDE SERPL-SCNC: 103 MMOL/L
CHOLEST SERPL-MCNC: 170 MG/DL
CHOLEST/HDLC SERPL: 2.8 RATIO
CO2 SERPL-SCNC: 25 MMOL/L
COLOR: YELLOW
CREAT SERPL-MCNC: 0.85 MG/DL
EOSINOPHIL # BLD AUTO: 0.09 K/UL
EOSINOPHIL NFR BLD AUTO: 1.4 %
GLUCOSE QUALITATIVE U: NEGATIVE
GLUCOSE SERPL-MCNC: 91 MG/DL
HCT VFR BLD CALC: 41.2 %
HDLC SERPL-MCNC: 60 MG/DL
HETEROPH AB SER QL: NEGATIVE
HGB BLD-MCNC: 12.8 G/DL
IMM GRANULOCYTES NFR BLD AUTO: 0.3 %
KETONES URINE: NEGATIVE
LDLC SERPL CALC-MCNC: 84 MG/DL
LEUKOCYTE ESTERASE URINE: NEGATIVE
LYMPHOCYTES # BLD AUTO: 2.07 K/UL
LYMPHOCYTES NFR BLD AUTO: 32.9 %
MAN DIFF?: NORMAL
MCHC RBC-ENTMCNC: 26.4 PG
MCHC RBC-ENTMCNC: 31.1 GM/DL
MCV RBC AUTO: 84.9 FL
MONOCYTES # BLD AUTO: 0.51 K/UL
MONOCYTES NFR BLD AUTO: 8.1 %
NEUTROPHILS # BLD AUTO: 3.57 K/UL
NEUTROPHILS NFR BLD AUTO: 56.7 %
NITRITE URINE: NEGATIVE
PH URINE: 5
PLATELET # BLD AUTO: 337 K/UL
POTASSIUM SERPL-SCNC: 4.5 MMOL/L
PROT SERPL-MCNC: 7.2 G/DL
PROTEIN URINE: NEGATIVE
RBC # BLD: 4.85 M/UL
RBC # FLD: 14.6 %
SODIUM SERPL-SCNC: 143 MMOL/L
SPECIFIC GRAVITY URINE: 1.03
TRIGL SERPL-MCNC: 130 MG/DL
TSH SERPL-ACNC: 1.63 UIU/ML
UROBILINOGEN URINE: NORMAL
VIT B12 SERPL-MCNC: 457 PG/ML
WBC # FLD AUTO: 6.3 K/UL

## 2020-08-21 LAB
SARS-COV-2 IGG SERPL IA-ACNC: 0.02 INDEX
SARS-COV-2 IGG SERPL QL IA: NEGATIVE

## 2020-10-13 ENCOUNTER — TRANSCRIPTION ENCOUNTER (OUTPATIENT)
Age: 25
End: 2020-10-13

## 2020-12-23 PROBLEM — Z87.09 HISTORY OF SORE THROAT: Status: RESOLVED | Noted: 2020-04-01 | Resolved: 2020-12-23

## 2021-01-27 ENCOUNTER — APPOINTMENT (OUTPATIENT)
Dept: INTERNAL MEDICINE | Facility: CLINIC | Age: 26
End: 2021-01-27

## 2021-07-08 NOTE — ED PROVIDER NOTE - NS ED MD DISPO DISCHARGE
Patient Seen in: THE [facility] Emergency Department In [place]      History   Patient presents with:  Swelling Edema  Deep Vein Thrombosis    Stated Complaint: left foot ,ankle, leg pain, r/o dvt    HPI/Subjective:   HPI    48-year-old female with a previous cyanosis. Patient noted have swelling to her left foot and left ankle. There is pitting edema. No erythema or induration noted. No open cuts or abrasions are noted. She has a 2+ dorsalis pedal pulse. Neuro: No focal deficit is noted.        ED Course the patient, I determined, within reasonable clinical confidence and prior to discharge, that an emergency medical condition was not or was no longer present. There was no indication for further evaluation, treatment or admission on an emergency basis.   C Home